# Patient Record
Sex: MALE | NOT HISPANIC OR LATINO | Employment: FULL TIME | URBAN - METROPOLITAN AREA
[De-identification: names, ages, dates, MRNs, and addresses within clinical notes are randomized per-mention and may not be internally consistent; named-entity substitution may affect disease eponyms.]

---

## 2023-11-06 PROBLEM — M25.552 CHRONIC LEFT HIP PAIN: Status: ACTIVE | Noted: 2023-11-06

## 2023-11-06 PROBLEM — M47.816 LUMBAR SPONDYLOSIS: Status: ACTIVE | Noted: 2023-11-06

## 2023-11-06 PROBLEM — M46.1 SACROILIITIS (CMS-HCC): Status: ACTIVE | Noted: 2023-09-18

## 2023-11-06 PROBLEM — B35.1 ONYCHOMYCOSIS: Status: ACTIVE | Noted: 2023-11-06

## 2023-11-06 PROBLEM — J40 SINOBRONCHITIS: Status: ACTIVE | Noted: 2023-11-06

## 2023-11-06 PROBLEM — G54.1 LUMBOSACRAL PLEXUS LESION: Status: ACTIVE | Noted: 2019-06-01

## 2023-11-06 PROBLEM — M96.1 POSTLAMINECTOMY SYNDROME OF LUMBAR REGION: Status: ACTIVE | Noted: 2023-11-06

## 2023-11-06 PROBLEM — D75.89 MACROCYTOSIS: Status: ACTIVE | Noted: 2023-11-06

## 2023-11-06 PROBLEM — M54.50 LUMBAGO: Status: ACTIVE | Noted: 2023-11-06

## 2023-11-06 PROBLEM — M51.26 LUMBAR DISC HERNIATION: Status: ACTIVE | Noted: 2023-11-06

## 2023-11-06 PROBLEM — R07.9 CHEST PAIN: Status: ACTIVE | Noted: 2023-02-21

## 2023-11-06 PROBLEM — G89.29 CHRONIC LEFT HIP PAIN: Status: ACTIVE | Noted: 2023-11-06

## 2023-11-06 PROBLEM — M47.817 LUMBOSACRAL SPONDYLOSIS: Status: ACTIVE | Noted: 2023-11-06

## 2023-11-06 PROBLEM — J32.9 SINOBRONCHITIS: Status: ACTIVE | Noted: 2023-11-06

## 2023-11-06 PROBLEM — M54.17 LUMBOSACRAL RADICULOPATHY AT L5: Status: ACTIVE | Noted: 2023-11-06

## 2023-11-06 RX ORDER — PANTOPRAZOLE SODIUM 40 MG/1
40 TABLET, DELAYED RELEASE ORAL
COMMUNITY
Start: 2023-01-20

## 2023-11-07 ENCOUNTER — OFFICE VISIT (OUTPATIENT)
Dept: NEUROSURGERY | Facility: CLINIC | Age: 43
End: 2023-11-07
Payer: COMMERCIAL

## 2023-11-07 VITALS
BODY MASS INDEX: 25.18 KG/M2 | HEIGHT: 69 IN | TEMPERATURE: 98.7 F | WEIGHT: 170 LBS | DIASTOLIC BLOOD PRESSURE: 98 MMHG | HEART RATE: 94 BPM | SYSTOLIC BLOOD PRESSURE: 152 MMHG

## 2023-11-07 DIAGNOSIS — M53.3 SI (SACROILIAC) JOINT DYSFUNCTION: Primary | ICD-10-CM

## 2023-11-07 PROCEDURE — 99203 OFFICE O/P NEW LOW 30 MIN: CPT | Performed by: NEUROLOGICAL SURGERY

## 2023-11-07 RX ORDER — FLUTICASONE PROPIONATE 50 MCG
2 SPRAY, SUSPENSION (ML) NASAL DAILY
COMMUNITY
Start: 2017-09-14

## 2023-11-07 RX ORDER — LORAZEPAM 1 MG/1
1-2 TABLET ORAL SEE ADMIN INSTRUCTIONS
Qty: 2 TABLET | Refills: 0 | Status: SHIPPED | OUTPATIENT
Start: 2023-11-07 | End: 2023-12-07

## 2023-11-07 RX ORDER — ALBUTEROL SULFATE 90 UG/1
2 AEROSOL, METERED RESPIRATORY (INHALATION) EVERY 4 HOURS PRN
COMMUNITY
Start: 2017-09-14

## 2023-11-07 ASSESSMENT — PATIENT HEALTH QUESTIONNAIRE - PHQ9
2. FEELING DOWN, DEPRESSED OR HOPELESS: NOT AT ALL
SUM OF ALL RESPONSES TO PHQ9 QUESTIONS 1 & 2: 0
1. LITTLE INTEREST OR PLEASURE IN DOING THINGS: NOT AT ALL

## 2023-11-07 ASSESSMENT — PAIN SCALES - GENERAL: PAINLEVEL: 4

## 2023-11-07 NOTE — PROGRESS NOTES
[facility] [place]  Department of Neurological Surgery  New Patient Visit    History of Present Illness:  Kyree De Luna is a 43 y.o. year old male who presents to the spine clinic with L SI joint pain.    Patient reports 4-5 years of left sided low back and buttock pain, treated in outpatient setting for SI joint pain by Dr. Clark. Patient has undergone 5 injections (09/2019, 10/2020, 08/2022, 03/2023, 09/2023) to the left SI joint with documented improvement in pain level up to 90%. Positive pain increase with provocative maneuvers of the left SI joint. Referred to clinic today for consideration of left SI joint fusion.    Of note, patient developed acute LLE radiculopathy in 2019 necessitating a L5-S1 postero-lateral instrumented fusion (12/2019, Dr. Dickey). He has since recovered well from this and is no longer experiencing radicular pain or paresthesias.    Prior Spine Surgeries: L5-S1 posterior decompression and instrumented fusion (Dr. Dickey)    Physical Therapy: Yes  Diabetic: No   Osteoporosis: No  Patient's BMI is There is no height or weight on file to calculate BMI.    Review of systems negative except as noted in the HPI.    Patient Active Problem List   Diagnosis    Chest pain    Chronic left hip pain    Lumbago    Lumbar disc herniation    Lumbar spondylosis    Lumbosacral spondylosis    Macrocytosis    Onychomycosis    Lumbosacral plexus lesion    Lumbosacral radiculopathy at L5    Postlaminectomy syndrome of lumbar region    Sacroiliitis (CMS/HCC)    Sinobronchitis     No past medical history on file.  No past surgical history on file.  Social History     Tobacco Use    Smoking status: Not on file    Smokeless tobacco: Not on file   Substance Use Topics    Alcohol use: Not on file     family history is not on file.    Current Outpatient Medications:     pantoprazole (ProtoNix) 40 mg EC tablet, Take 1 tablet (40 mg) by mouth., Disp: , Rfl:     albuterol (ProAir HFA) 90  mcg/actuation inhaler, Inhale 2 puffs every 4 hours if needed., Disp: , Rfl:     fluticasone (Flonase) 50 mcg/actuation nasal spray, Administer 2 sprays into affected nostril(s) once daily., Disp: , Rfl:   Allergies   Allergen Reactions    Oxycodone Dizziness, GI Upset, Nausea Only and Nausea/vomiting    Penicillins Swelling       Physical Examination    Appears stated age, NAD  RRR, CTAB  Abdomen soft, nontender  Normal ROM    Awake, Ox3  BUE D/B/T/HG/IO 5  RLE HF/KE/DF/PF 5  LLE HF 4+ KE/DF/PF 5 (pain limited)  SILT  No rodney or clonus, no hyperreflexia    Left SI joint testing: Positive Norma, positive thigh thrust, positive compression    Results    I personally reviewed and interpreted the imaging results which included X-ray of the pelvis, demonstrating arthritic changes of the left sacro-iliac joint with associated sclerosis, intact prior lumbo-sacral instrumentation.  There is no significant arthritis or joint space narrowing in the left hip.    Assessment and Plan:    Kyree De Luna is a 43 y.o. year old male who presents to the spine clinic with 4-5 years of left SI joint pain, referral for consideration of SI joint fusion.  His imaging shows arthritis within the SI joint.  There is no significant hip pathology.  X-rays of the lumbar spine show normal alignment with mild degenerative changes.  There is prior L5-S1 fusion.  He has positive provocative testing for SI joint dysfunction on the left.  He has had a positive response to multiple left SI joint injections with greater than 90% pain relief.  His symptoms, imaging findings and response to treatment are consistent with a diagnosis of left SI joint dysfunction.  I discussed multiple treatment options with the patient, including further conservative care as well as treatment surgical approaches.  At this time, I am recommending a minimally invasive left SI joint fusion.  I went over the risks associate with surgery, including infection, bleeding,  neurologic injury and the need for further procedures.  All of the patient's questions were answered and he has elected to proceed with surgery.  I am ordering a new lumbar MRI to rule out any new lumbar pathology in the meantime.

## 2024-01-08 ENCOUNTER — TELEPHONE (OUTPATIENT)
Dept: NEUROSURGERY | Facility: CLINIC | Age: 44
End: 2024-01-08
Payer: COMMERCIAL